# Patient Record
Sex: FEMALE | Race: WHITE
[De-identification: names, ages, dates, MRNs, and addresses within clinical notes are randomized per-mention and may not be internally consistent; named-entity substitution may affect disease eponyms.]

---

## 2017-01-13 ENCOUNTER — HOSPITAL ENCOUNTER (OUTPATIENT)
Dept: HOSPITAL 45 - C.LABMFLN | Age: 67
Discharge: HOME | End: 2017-01-13
Attending: FAMILY MEDICINE
Payer: COMMERCIAL

## 2017-01-13 DIAGNOSIS — R39.15: Primary | ICD-10-CM

## 2017-05-17 ENCOUNTER — HOSPITAL ENCOUNTER (OUTPATIENT)
Dept: HOSPITAL 45 - C.LABMFLN | Age: 67
End: 2017-05-17
Attending: FAMILY MEDICINE
Payer: COMMERCIAL

## 2017-05-17 DIAGNOSIS — M25.50: ICD-10-CM

## 2017-05-17 DIAGNOSIS — E55.9: ICD-10-CM

## 2017-05-17 DIAGNOSIS — R53.83: Primary | ICD-10-CM

## 2017-05-17 DIAGNOSIS — Z11.59: ICD-10-CM

## 2017-05-17 LAB
ALBUMIN/GLOB SERPL: 1 {RATIO} (ref 0.9–2)
ALP SERPL-CCNC: 52 U/L (ref 45–117)
ALT SERPL-CCNC: 33 U/L (ref 12–78)
ANION GAP SERPL CALC-SCNC: 7 MMOL/L (ref 3–11)
AST SERPL-CCNC: 26 U/L (ref 15–37)
BASOPHILS # BLD: 0.02 K/UL (ref 0–0.2)
BASOPHILS NFR BLD: 0.3 %
BUN SERPL-MCNC: 13 MG/DL (ref 7–18)
BUN/CREAT SERPL: 18.7 (ref 10–20)
CALCIUM SERPL-MCNC: 8.8 MG/DL (ref 8.5–10.1)
CHLORIDE SERPL-SCNC: 108 MMOL/L (ref 98–107)
CO2 SERPL-SCNC: 26 MMOL/L (ref 21–32)
COMPLETE: YES
CREAT SERPL-MCNC: 0.69 MG/DL (ref 0.6–1.2)
EOSINOPHIL NFR BLD AUTO: 352 K/UL (ref 130–400)
GLOBULIN SER-MCNC: 3.3 GM/DL (ref 2.5–4)
GLUCOSE SERPL-MCNC: 83 MG/DL (ref 70–99)
HCT VFR BLD CALC: 42.3 % (ref 37–47)
IG%: 0.2 %
IMM GRANULOCYTES NFR BLD AUTO: 37.4 %
LYME DISEASE AB IGG: (no result)
LYME DISEASE AB IGM: (no result)
LYMPHOCYTES # BLD: 2.46 K/UL (ref 1.2–3.4)
MCH RBC QN AUTO: 30.7 PG (ref 25–34)
MCHC RBC AUTO-ENTMCNC: 33.3 G/DL (ref 32–36)
MCV RBC AUTO: 92.2 FL (ref 80–100)
MONOCYTES NFR BLD: 8.8 %
NEUTROPHILS # BLD AUTO: 1.1 %
NEUTROPHILS NFR BLD AUTO: 52.2 %
PMV BLD AUTO: 9.8 FL (ref 7.4–10.4)
POTASSIUM SERPL-SCNC: 3.5 MMOL/L (ref 3.5–5.1)
RBC # BLD AUTO: 4.59 M/UL (ref 4.2–5.4)
RHEUMATOID FACT FLD-ACNC: < 10 U/ML (ref 0–15)
SODIUM SERPL-SCNC: 141 MMOL/L (ref 136–145)
TSH SERPL-ACNC: 1.27 UIU/ML (ref 0.3–4.5)
WBC # BLD AUTO: 6.57 K/UL (ref 4.8–10.8)

## 2017-05-25 NOTE — CODING QUERY MEDICAL NECESSITY
SUPPORTING DIAGNOSIS NEEDED



Dr. Merrill,



A supporting diagnosis is required for the test/procedure performed on this patient in 
order for us to be reimbursed by the patient's insurance. Please provide a supporting 
diagnosis for the following test/procedure listed below next to the test name along with 
your signature. 



*If there is no additional diagnosis for this patient that would support the following 
test/procedure please document that below next to the test/procedure.



Test(s)/Procedure(s) that require a supporting diagnosis:





* ([id number]) VITAMIN D ASSAY          DIAGNOSIS:





***DATE OF SERVICE: 5/17/17***





Provider Signature:  ______________________________  Date:  _______



Thank you  

Shiva Glover

[facility] Information Management

Phone:  [phone]

Fax:  [phone]



Once completed, please kindly fax back to [phone]



For questions please call [phone]

## 2017-05-26 ENCOUNTER — HOSPITAL ENCOUNTER (OUTPATIENT)
Dept: HOSPITAL 45 - C.CPL | Age: 67
End: 2017-05-26
Attending: FAMILY MEDICINE
Payer: COMMERCIAL

## 2017-05-26 DIAGNOSIS — R06.02: Primary | ICD-10-CM

## 2017-05-27 NOTE — EXERCISE STRESS ECHO
*NOTICE TO RECEIVING PARTY AGENCY**  This information is strictly Confidential and protected under 
[place] law.  [place] law prohibits you from making any further disclosure of this 
information unless further disclosure is expressly permitted by the written consent of the person to 
whom it pertains or is authorized by law.  A general authorization for the release of medical or 
other information is not sufficient for this purpose.  Hospital accepts no responsibility if the 
information is made available to any other person, INCLUDING THE PATIENT.



Interpretation Summary

  *  Name: VILMA JESUS  Study Date: 2017 09:35 AM  BP: 133/82 mmHg

  *  MRN: [id number]  Patient Location: [facility]  HR: 103

  *  : 1950 (M/d/yyyy)  Gender: Female  Height: 65 in

  *  Age: 67 yrs    Weight: 136 lb

  *  Ordering Physician: Ann Marie Merrill MD

  *  Performed By: Becca Alvarado RDCS

  *  Accession# [id number]  Account# [id number]

  *  Reason For Study: Exertional shortness of breath

  *  BSA: 1.7 m2

  *  -- Conclusions --

  *  Stress Echo:

  *  1. Negative stress echo for ischemia at 104% MPHR.

  *  2. Negative exercise ECG for ischemia at 104% MPHR.

  *  3. No chest pain reported.

  *  4. Appropriate blood pressure response to exercise.

  *  5. No arrhythmia.

  *  6. Good exercise tolerance.

  *  Echo:

  *  1. Normal left ventricular size and systolic function.  EF 60-65%.  No regional wall motion 
abnormalities.  Mild concentric left ventricular hypertrophy.  Type 1 diastolic dysfunction.

  *  2. Trace aortic regurgitation.

  *  3. Mildly dilated ascending aorta.

  *  4. No prior study available for comparison.

Procedure Details

  *  ECHOEX, CPT #58666

  *  ECHO DOPPLER, CPT #64831

  *  ECHO COLOR FLOW, CPT #02068

Left Ventricle

  *  The left ventricle is normal in size.

  *  There is mild concentric left ventricular hypertrophy.

  *  Ejection Fraction = 60-65%.

  *  Left ventricular systolic function is normal.

  *  Resting wall motion: Normal.  Stress wall motion: Appropriate increase in Left ventricular 
systolic function and decrease in cavity size.  No stress induced segmental wall motion 
abnormalities.

  *  The left ventricular ejection fraction increases normally with stress.  The left ventricular 
end-systolic cavity size reduces post-stress (normal response). The left ventricular wall motion 
with stress is normal.

Right Ventricle

  *  The right ventricle is normal in size and function.

  *  The right ventricular systolic function is normal as assessed by tricuspid annular plane 
systolic excursion (TAPSE) (normal >1.5 cm).

Atria

  *  The left atrial size is normal.

  *  Right atrial size is normal.

  *  There is no evidence of atrial septal defect, but resolution does not allow assessment for a 
patent foramen ovale.

Mitral Valve

  *  The mitral valve leaflets appear normal. There is no evidence of stenosis, fluttering, or 
prolapse.

  *  There is no mitral regurgitation noted.

Tricuspid Valve

  *  The tricuspid valve is not well visualized, but is grossly normal.

  *  There is no tricuspid stenosis.

  *  There is trace tricuspid regurgitation.

Aortic Valve

  *  The aortic valve is trileaflet.

  *  The aortic valve is normal in structure and function.

  *  No hemodynamically significant valvular aortic stenosis.

  *  Trace aortic regurgitation.

Pulmonic Valve

  *  The pulmonary valve is inadequately visualized, but the Doppler data is adequate for 
interpretation.

  *  There is no significant pulmonary regurgitation.

Great Vessels

  *  The aortic root is normal size.

  *  Mildly dilated ascending aorta.

  *  Normal pulmonary venous flow pattern.  Normal IVC size and inspiratory collapse.

Pericardium

  *  There is no pericardial effusion.

Stress Parameters

  *  Sinus rhythm with PACs at 78 bpm.

  *  Stress ECG: No ST changes.  No arrhythmias.

  *  No arrhythmia were noted with stress.

  *  Rest heart rate was '103' BPM.

  *  Rest blood pressure was '133/82'

  *  Maximum heart rate achieved was 162 bpm.

  *  Maximum heart rate was 104 % of maximum age-predicted heart rate.

  *  Maximum blood pressure was '183/87'

  *  Total exercise time was '9:07'

  *  Maximum exercise MET level achieved was '10.20' METS

  *  Maximum treadmill speed was '4.20' miles per hour.

  *  Maximum treadmill elevation was '16.00'% grade.

  *  Exercise was terminated due to 'achieving target heart rate'

  *  Normal blood pressure response to exercise.

Left Ventricular Diastolic Function

  *  Grade I diastolic dysfunction, (abnormal relaxation pattern).



MMode 2D Measurements and Calculations

IVSd 1.2 cm



LVIDd 3.7 cm

LVIDs 2.5 cm

LVPWd 1.2 cm



IVS/LVPW 1.0 

FS 33.2 %

EDV(Teich) 56.9 ml

ESV(Teich) 21.2 ml

EF(Teich) 62.7 %



EDV(cubed) 49.3 ml

ESV(cubed) 14.7 ml

EF(cubed) 70.1 %





LV mass(C)d 137.9 grams

LV mass(C)dI 82.1 grams/m\S\2



SV(Teich) 35.7 ml

SI(Teich) 21.2 ml/m\S\2

SV(cubed) 34.6 ml

SI(cubed) 20.6 ml/m\S\2



Ao root diam 3.6 cm

Ao root area 10.1 cm\S\2

ACS 2.0 cm

LA dimension 2.2 cm



asc Aorta Diam 4.0 cm





LA/Ao 0.61 

LVOT diam 2.0 cm

LVOT area 3.0 cm\S\2



LVAd ap4 20.2 cm\S\2

LVLd ap4 6.8 cm

EDV(MOD-sp4) 50.5 ml

EDV(sp4-el) 51.3 ml

LVAs ap4 11.3 cm\S\2

LVLs ap4 5.9 cm

ESV(MOD-sp4) 18.8 ml

ESV(sp4-el) 18.3 ml

EF(MOD-sp4) 62.9 %

EF(sp4-el) 64.3 %



LVAd ap2 18.2 cm\S\2

LVLd ap2 6.8 cm

EDV(MOD-sp2) 40.6 ml

EDV(sp2-el) 41.4 ml

LVAs ap2 10.0 cm\S\2

LVLs ap2 5.3 cm

ESV(MOD-sp2) 15.9 ml

ESV(sp2-el) 15.9 ml

EF(MOD-sp2) 60.8 %

EF(sp2-el) 61.6 %



LVLd %diff 0.17 %

EDV(MOD-bp) 45.3 ml

LVLs %diff -11.15 %

ESV(MOD-bp) 18.2 ml

EF(MOD-bp) 59.9 %





SV(MOD-sp4) 31.8 ml

SI(MOD-sp4) 18.9 ml/m\S\2



SV(MOD-sp2) 24.7 ml

SI(MOD-sp2) 14.7 ml/m\S\2



SV(MOD-bp) 27.1 ml

SI(MOD-bp) 16.2 ml/m\S\2



SV(sp4-el) 33.0 ml

SI(sp4-el) 19.6 ml/m\S\2





SV(sp2-el) 25.5 ml

SI(sp2-el) 15.2 ml/m\S\2













Doppler Measurements and Calculations

MV E max melissa 55.8 cm/sec

MV A max melissa 74.2 cm/sec



MV E/A 0.75 



MV dec time 0.26 sec



Ao V2 max 105.1 cm/sec

Ao max PG 4.4 mmHg

Ao max PG (full) 0.99 mmHg

ANASTACIA(V,A) 2.7 cm\S\2

ANASTACIA(V,D) 2.7 cm\S\2





LV V1 max PG 3.4 mmHg



LV V1 max 92.6 cm/sec



PA V2 max 89.6 cm/sec

PA max PG 3.2 mmHg

PA acc slope 763.4 cm/sec\S\2

PA acc time 0.08 sec



TR max melissa 180.7 cm/sec





PA pr(Accel) 41.0 mmHg

## 2017-09-07 ENCOUNTER — HOSPITAL ENCOUNTER (OUTPATIENT)
Dept: HOSPITAL 45 - C.LABMFLN | Age: 67
Discharge: HOME | End: 2017-09-07
Attending: FAMILY MEDICINE
Payer: COMMERCIAL

## 2017-09-07 DIAGNOSIS — Z00.00: Primary | ICD-10-CM

## 2017-09-07 DIAGNOSIS — E78.5: ICD-10-CM

## 2017-09-07 DIAGNOSIS — J30.9: ICD-10-CM

## 2017-09-07 DIAGNOSIS — M25.50: ICD-10-CM

## 2017-09-07 DIAGNOSIS — J45.901: ICD-10-CM

## 2017-09-07 DIAGNOSIS — M19.90: ICD-10-CM

## 2017-09-07 LAB
CHOLEST/HDLC SERPL: 3.9 {RATIO}
GLUCOSE UR QL: 57 MG/DL
KETONES UR QL STRIP: 128 MG/DL
NITRITE UR QL STRIP: 194 MG/DL (ref 0–150)
PH UR: 224 MG/DL (ref 0–200)
VERY LOW DENSITY LIPOPROT CALC: 39 MG/DL

## 2018-07-23 ENCOUNTER — HOSPITAL ENCOUNTER (OUTPATIENT)
Dept: HOSPITAL 45 - C.LABMFLN | Age: 68
Discharge: HOME | End: 2018-07-23
Attending: PHYSICIAN ASSISTANT
Payer: COMMERCIAL

## 2018-07-23 DIAGNOSIS — R35.0: Primary | ICD-10-CM

## 2018-08-14 ENCOUNTER — HOSPITAL ENCOUNTER (OUTPATIENT)
Dept: HOSPITAL 45 - C.LABMFLN | Age: 68
Discharge: HOME | End: 2018-08-14
Attending: FAMILY MEDICINE
Payer: COMMERCIAL

## 2018-08-14 DIAGNOSIS — R53.83: Primary | ICD-10-CM

## 2018-08-14 LAB
ALBUMIN SERPL-MCNC: 3.3 GM/DL (ref 3.4–5)
ALP SERPL-CCNC: 45 U/L (ref 45–117)
ALT SERPL-CCNC: 23 U/L (ref 12–78)
AST SERPL-CCNC: 17 U/L (ref 15–37)
BASOPHILS # BLD: 0.02 K/UL (ref 0–0.2)
BASOPHILS NFR BLD: 0.3 %
BUN SERPL-MCNC: 13 MG/DL (ref 7–18)
CALCIUM SERPL-MCNC: 9.2 MG/DL (ref 8.5–10.1)
CO2 SERPL-SCNC: 27 MMOL/L (ref 21–32)
CREAT SERPL-MCNC: 0.74 MG/DL (ref 0.6–1.2)
EOS ABS #: 0.1 K/UL (ref 0–0.5)
EOSINOPHIL NFR BLD AUTO: 381 K/UL (ref 130–400)
GLUCOSE SERPL-MCNC: 82 MG/DL (ref 70–99)
HCT VFR BLD CALC: 40.2 % (ref 37–47)
HGB BLD-MCNC: 13.3 G/DL (ref 12–16)
IG#: 0.02 K/UL (ref 0–0.02)
IMM GRANULOCYTES NFR BLD AUTO: 32.6 %
LYMPHOCYTES # BLD: 2.54 K/UL (ref 1.2–3.4)
MCH RBC QN AUTO: 30.6 PG (ref 25–34)
MCHC RBC AUTO-ENTMCNC: 33.1 G/DL (ref 32–36)
MCV RBC AUTO: 92.4 FL (ref 80–100)
MONO ABS #: 0.56 K/UL (ref 0.11–0.59)
MONOCYTES NFR BLD: 7.2 %
NEUT ABS #: 4.54 K/UL (ref 1.4–6.5)
NEUTROPHILS # BLD AUTO: 1.3 %
NEUTROPHILS NFR BLD AUTO: 58.3 %
PMV BLD AUTO: 10.1 FL (ref 7.4–10.4)
POTASSIUM SERPL-SCNC: 3.8 MMOL/L (ref 3.5–5.1)
PROT SERPL-MCNC: 6.4 GM/DL (ref 6.4–8.2)
RED CELL DISTRIBUTION WIDTH CV: 12.7 % (ref 11.5–14.5)
RED CELL DISTRIBUTION WIDTH SD: 43.2 FL (ref 36.4–46.3)
SODIUM SERPL-SCNC: 134 MMOL/L (ref 136–145)
WBC # BLD AUTO: 7.78 K/UL (ref 4.8–10.8)